# Patient Record
(demographics unavailable — no encounter records)

---

## 2025-03-02 NOTE — HISTORY OF PRESENT ILLNESS
[FreeTextEntry1] : Baudilio is a well appearing, active 18 year old who was referred for cardiac evaluation in the setting of a family history of congenital heart disease. She has high cholesterol.   Baudilio presents doing well.  Baudilio reports frequent bouts of orthostatic dizziness. Symptoms tend to be brief and self resolve.  Isolated episode of syncope in the past while standing and having her ear pierced. She was anemic at the time per parent report.  There are times of fasting, inadequate hydration. Diet high in sugar.  She has heavy periods and IBS. Anxiety and depression. Sees psychiatrist and therapist.  On sertraline and Seroquel stable dose for past 6 months. Denies symptom increase with past dosage increase.   There has been no chest pain, palpitations, shortness of breath. There has been no history of exercise induced symptoms nor recent changes in exercise tolerance or activity levels. Never chest pain, syncope, palpitations with exercise.  Reports good energy levels. She is overweight decreased weight since 2022.   Reviewed labs (2024): Normal CBC, CMP, Iron studies aside for ferritin high.  Elevated total cholesterol (297), Low HDL (39), Triglycerides (113), High LDL ( 238), high Non-HDL (258). Hgb A1c (4.9), elevated T4 (13.8), Homocysteine high (20). Positive for MTHFR gene.   There has been no recent fevers, illnesses or hospitalizations.   Mom: VSD MGF: HTN and CAD ( > 49 y/o) No additionally reported family history of an arrhythmia, aortic aneurysm, unexplained death, bicuspid aortic valve,  congenital heart disease, cardiomyopathy or sudden cardiac death, long QT syndrome, drowning or unexplained accidental death.

## 2025-03-02 NOTE — PHYSICAL EXAM
[General Appearance - Alert] : alert [General Appearance - In No Acute Distress] : in no acute distress [General Appearance - Well Nourished] : well nourished [General Appearance - Well Developed] : well developed [General Appearance - Well-Appearing] : well appearing [Appearance Of Head] : the head was normocephalic [Facies] : there were no dysmorphic facial features [Sclera] : the conjunctiva were normal [Outer Ear] : the ears and nose were normal in appearance [Examination Of The Oral Cavity] : mucous membranes were moist and pink [Auscultation Breath Sounds / Voice Sounds] : breath sounds clear to auscultation bilaterally [Normal Chest Appearance] : the chest was normal in appearance [Apical Impulse] : quiet precordium with normal apical impulse [Heart Rate And Rhythm] : normal heart rate and rhythm [Heart Sounds] : normal S1 and S2 [No Murmur] : no murmurs  [Heart Sounds Gallop] : no gallops [Heart Sounds Pericardial Friction Rub] : no pericardial rub [Edema] : no edema [Arterial Pulses] : normal upper and lower extremity pulses with no pulse delay [Heart Sounds Click] : no clicks [Capillary Refill Test] : normal capillary refill [Bowel Sounds] : normal bowel sounds [Abdomen Soft] : soft [Nondistended] : nondistended [Abdomen Tenderness] : non-tender [Motor Tone] : normal muscle strength and tone [Nail Clubbing] : no clubbing  or cyanosis of the fingers [Cervical Lymph Nodes Enlarged Anterior] : The anterior cervical nodes were normal [Cervical Lymph Nodes Enlarged Posterior] : The posterior cervical nodes were normal [] : no rash [Skin Lesions] : no lesions [Skin Turgor] : normal turgor [Mood] : mood and affect were appropriate for age [Demonstrated Behavior - Infant Nonreactive To Parents] : interactive [Demonstrated Behavior] : normal behavior [FreeTextEntry7] : Femoral Pulse +2 B/L; Posterior tibial pulse +2 B/L

## 2025-03-02 NOTE — HISTORY OF PRESENT ILLNESS
[FreeTextEntry1] : Baudilio is a well appearing, active 18 year old who was referred for cardiac evaluation in the setting of a family history of congenital heart disease. She has high cholesterol.   Baudilio presents doing well.  Baudilio reports frequent bouts of orthostatic dizziness. Symptoms tend to be brief and self resolve.  Isolated episode of syncope in the past while standing and having her ear pierced. She was anemic at the time per parent report.  There are times of fasting, inadequate hydration. Diet high in sugar.  She has heavy periods and IBS. Anxiety and depression. Sees psychiatrist and therapist.  On sertraline and Seroquel stable dose for past 6 months. Denies symptom increase with past dosage increase.   There has been no chest pain, palpitations, shortness of breath. There has been no history of exercise induced symptoms nor recent changes in exercise tolerance or activity levels. Never chest pain, syncope, palpitations with exercise.  Reports good energy levels. She is overweight decreased weight since 2022.   Reviewed labs (2024): Normal CBC, CMP, Iron studies aside for ferritin high.  Elevated total cholesterol (297), Low HDL (39), Triglycerides (113), High LDL ( 238), high Non-HDL (258). Hgb A1c (4.9), elevated T4 (13.8), Homocysteine high (20). Positive for MTHFR gene.   There has been no recent fevers, illnesses or hospitalizations.   Mom: VSD MGF: HTN and CAD ( > 51 y/o) No additionally reported family history of an arrhythmia, aortic aneurysm, unexplained death, bicuspid aortic valve,  congenital heart disease, cardiomyopathy or sudden cardiac death, long QT syndrome, drowning or unexplained accidental death.

## 2025-03-02 NOTE — CONSULT LETTER
[Today's Date] : [unfilled] [Name] : Name: [unfilled] [] : : ~~ [Today's Date:] : [unfilled] [Dear  ___:] : Dear Dr. [unfilled]: [Consult] : I had the pleasure of evaluating your patient, [unfilled]. My full evaluation follows. [Consult - Single Provider] : Thank you very much for allowing me to participate in the care of this patient. If you have any questions, please do not hesitate to contact me. [Sincerely,] : Sincerely, [FreeTextEntry4] : German Mace MD [FreeTextEntry5] : [address] [FreeTextEntry6] : [place] [id number] [de-identified] : Heraclio Laughlin DO MPH Pediatric Cardiology [facility]

## 2025-03-02 NOTE — DISCUSSION/SUMMARY
[FreeTextEntry1] : LISSY  is a healthy, thriving 18 year old who presents without identified concerns for congestive heart failure nor impaired cardiac output by her  past medical history nor on her  current physical exam.    - No significant atrial septal defect seen. Could not exclude a PFO. I explained to her  parents that this a remnant of fetal circulation and will likely close spontaneously, however it can remain patent in up to 30% of the adult population as a normal variant. I would be happy to reassess for its patency during a follow up visit, however is not a hemodynamically significant lesion at this time. If the PFO remains patent, considerations for the future would be the risk of paradoxical embolism either from a venous thrombosis or a venous gas bubble during scuba diving. There may also be associations of PFO with migraine headaches. For this reason, LISSY  should simply be aware of this part of her  medical history.   - Trivial tricuspid, pulmonary and mitral regurgitation in otherwise well functioning valves. This was not audible on physical exam and not hemodynamically significant at this time.   -There appears to be a mild sigmoid shape to her left ventricular septum. There is an area of prominence to the septum which "bulges" into the left ventricular outflow tract. It is not resulting in LVOT obstruction at this time. This finding can range from a pathologic precursor to development of hypertrophic cardiomyopathy or progression to LVOT obstruction that should be monitored for to a more benign shape variant albeit seen more commonly in adults. Recommended longitudinal surveillance and screening of first degree relatives. Preserved systolic function.    -   Incidentally noted to have a likely "high take off" of the right coronary artery which appears to arise around the 12:00 position from its appropriate right sinus of valsalva. Typically non-pathologic variant; no identified correlating clinical concerns at this time and with normal EKG. No history of exertional chest pain or intolerance. Development of either of the aforementioned concerns would warrant consideration for more advanced imaging modalities and possible further testing.   -Her pulmonary veins were difficult to delineate. Low suspicion for an anomalous origin without appreciated evidence otherwise. Will attempt to clarify at follow up.   -Frequent, brief, orthostatic dizziness in setting of times of fasting and inadequate hydration. An isolated episode of syncope in the past at rest by description appears vasovagal in origin. No recent syncope or near syncope. She was very nervous during our evaluation and found to have elevated systolic blood pressure throughout positional change and with an appreciable increase in heart rate response. Normal blood pressure in reviewed PCP records last year.   - -We discussed the need to maintain adequate hydration, drinking at least 8-10 full glasses of water per day.   -We discussed eating an adequate, healthy diet.  We discussed standing up slowly from a lying or seated position to avoid these episodes, as well as recognizing the warning signs (lightheadedness, nausea) and sitting or lying down when they occur.   -If necessary, increasing salt intake may also help alleviate these symptoms but would want to monitor blood pressure first before instituting high salt diet.   -We discussed management strategies including medication should symptoms worsen or fail to improve.  -Maintain regular aerobic exercise; reviewed symptoms with exercise that should prompt medical evaluation  -Recommended home ambulatory blood pressure monitoring. Provide records during interim prior to follow up. Likely white coat hypertension contributing but pathology should be considered.  --Should syncope or near syncope occur to contact our office for evaluation.   -  Recent lipid profile ( reportedly fasting) demonstrated an elevated total cholesterol and elevated LDL.  Her HDL was also low which serves as an additional risk factor for development of heart disease. No diabetes. There are elevated blood pressures.  We discussed the risks of a cumulative burden of high cholesterol on development of heart disease, metabolic disease and coronary artery disease. There is some family history of high cholesterol but no known familial dyslipidemia and without identified members with early onset coronary artery disease. I recommended the following:  -Ample room for dietary modifications. Recommended nutrition consultation. Increase fiber, fruits, vegetables, fish, poultry. Limit fats, sugars, dairy, red meats, cholesterol containing foods -Recommended increased aerobic exercise and reviewed symptoms with exercise to monitor for.  -Discussed implementing aforementioned changes for 4-6 months followed by a repeat fasting lipid panel.  -Discussed avoidance of additional risk factors such as obesity, alcohol and smoking -Discussed thresholds for medication initiation particularly to control LDL and triglycerides -Nutritionist referral   I recommended 4-6 month follow up and we reviewed signs and symptoms that should prompt medical attention and sooner evaluation. Above information explained in detail with assistance of a colored diagram.  LYLAH and family verbalized their understanding and all questions were answered.   [Needs SBE Prophylaxis] : [unfilled] does not need bacterial endocarditis prophylaxis [PE + No Restrictions] : [unfilled] may participate in the entire physical education program without restriction, including all varsity competitive sports.

## 2025-03-02 NOTE — CARDIOLOGY SUMMARY
[LVSF ___%] : LV Shortening Fraction [unfilled]% [de-identified] : 2/24/25 [FreeTextEntry1] : Normal sinus rhythm @ 83 bpm NM: 132 ms QRS: 90 ms  QTc: ~427 ms P-R-T Axis (51-46-28) Normal voltage and intervals No ST segment abnormalities No pre-excitation  [de-identified] : 2/24/25 [FreeTextEntry2] :  A complete 2D, M-mode, doppler and color flow doppler transthoracic pediatric echocardiogram was performed. The intracardiac anatomy and doppler flow profiles were otherwise normal appearing with the following:   Summary: 1. No evidence of significant atrial communication; cannot rule out a patent foramen ovale. 2. There appears to be a mild sigmoid shape to the interventricular septum bulging into the left ventricular outflow tract. 3. No evidence of left ventricular outflow tract obstruction. 4. The right coronary artery appears to arise from around the 12 o'clock position from above its appropriate right sinus. 5. Trivial tricuspid valve regurgitation. 6. Trivial pulmonary valve regurgitation. 7. Trivial mitral valve regurgitation. 8. Normal right ventricular morphology with qualitatively normal size and systolic function. 9. Normal left ventricular size, morphology and systolic function. 10. At least one right pulmonary vein and two left pulmonary veins return to the morphologic left atrium. 11. No pericardial effusion.

## 2025-03-02 NOTE — PHYSICAL EXAM
[General Appearance - Alert] : alert [General Appearance - In No Acute Distress] : in no acute distress [General Appearance - Well Nourished] : well nourished [General Appearance - Well-Appearing] : well appearing [General Appearance - Well Developed] : well developed [Appearance Of Head] : the head was normocephalic [Facies] : there were no dysmorphic facial features [Sclera] : the conjunctiva were normal [Outer Ear] : the ears and nose were normal in appearance [Examination Of The Oral Cavity] : mucous membranes were moist and pink [Auscultation Breath Sounds / Voice Sounds] : breath sounds clear to auscultation bilaterally [Normal Chest Appearance] : the chest was normal in appearance [Apical Impulse] : quiet precordium with normal apical impulse [Heart Rate And Rhythm] : normal heart rate and rhythm [Heart Sounds] : normal S1 and S2 [No Murmur] : no murmurs  [Heart Sounds Gallop] : no gallops [Heart Sounds Pericardial Friction Rub] : no pericardial rub [Edema] : no edema [Arterial Pulses] : normal upper and lower extremity pulses with no pulse delay [Capillary Refill Test] : normal capillary refill [Heart Sounds Click] : no clicks [Bowel Sounds] : normal bowel sounds [Abdomen Soft] : soft [Nondistended] : nondistended [Abdomen Tenderness] : non-tender [Motor Tone] : normal muscle strength and tone [Nail Clubbing] : no clubbing  or cyanosis of the fingers [Cervical Lymph Nodes Enlarged Anterior] : The anterior cervical nodes were normal [Cervical Lymph Nodes Enlarged Posterior] : The posterior cervical nodes were normal [] : no rash [Skin Lesions] : no lesions [Skin Turgor] : normal turgor [Demonstrated Behavior - Infant Nonreactive To Parents] : interactive [Mood] : mood and affect were appropriate for age [Demonstrated Behavior] : normal behavior [FreeTextEntry7] : Femoral Pulse +2 B/L; Posterior tibial pulse +2 B/L

## 2025-03-02 NOTE — CARDIOLOGY SUMMARY
[LVSF ___%] : LV Shortening Fraction [unfilled]% [de-identified] : 2/24/25 [FreeTextEntry1] : Normal sinus rhythm @ 83 bpm FL: 132 ms QRS: 90 ms  QTc: ~427 ms P-R-T Axis (51-46-28) Normal voltage and intervals No ST segment abnormalities No pre-excitation  [de-identified] : 2/24/25 [FreeTextEntry2] :  A complete 2D, M-mode, doppler and color flow doppler transthoracic pediatric echocardiogram was performed. The intracardiac anatomy and doppler flow profiles were otherwise normal appearing with the following:   Summary: 1. No evidence of significant atrial communication; cannot rule out a patent foramen ovale. 2. There appears to be a mild sigmoid shape to the interventricular septum bulging into the left ventricular outflow tract. 3. No evidence of left ventricular outflow tract obstruction. 4. The right coronary artery appears to arise from around the 12 o'clock position from above its appropriate right sinus. 5. Trivial tricuspid valve regurgitation. 6. Trivial pulmonary valve regurgitation. 7. Trivial mitral valve regurgitation. 8. Normal right ventricular morphology with qualitatively normal size and systolic function. 9. Normal left ventricular size, morphology and systolic function. 10. At least one right pulmonary vein and two left pulmonary veins return to the morphologic left atrium. 11. No pericardial effusion.

## 2025-03-02 NOTE — CONSULT LETTER
[Today's Date] : [unfilled] [Name] : Name: [unfilled] [] : : ~~ [Today's Date:] : [unfilled] [Dear  ___:] : Dear Dr. [unfilled]: [Consult] : I had the pleasure of evaluating your patient, [unfilled]. My full evaluation follows. [Consult - Single Provider] : Thank you very much for allowing me to participate in the care of this patient. If you have any questions, please do not hesitate to contact me. [Sincerely,] : Sincerely, [FreeTextEntry4] : German Mace MD [FreeTextEntry5] : [address] [FreeTextEntry6] : [place] [address] [de-identified] : Heraclio Laughlin DO MPH Pediatric Cardiology [facility]

## 2025-03-13 NOTE — REVIEW OF SYSTEMS
[Abn Vaginal bleeding] : abnormal vaginal bleeding [Pelvic pain] : pelvic pain [Anxiety] : anxiety [Depression] : depression [Negative] : Heme/Lymph

## 2025-03-13 NOTE — PLAN
[FreeTextEntry1] : GC/chlamydia ordered pap ordered  OCPs renewed  Pt to follow up for her annual in 1 year or PRN  SBE discussed

## 2025-03-13 NOTE — PHYSICAL EXAM
[Appropriately responsive] : appropriately responsive [Soft] : soft [Non-tender] : non-tender [Oriented x3] : oriented x3 [Examination Of The Breasts] : a normal appearance [No Discharge] : no discharge [No Masses] : no breast masses were palpable [Labia Majora] : normal [Labia Minora] : normal [Normal] : normal [Uterine Adnexae] : normal

## 2025-03-13 NOTE — HISTORY OF PRESENT ILLNESS
[HIV test declined] : HIV test declined [Syphilis test declined] : Syphilis test declined [Gonorrhea test declined] : Gonorrhea test declined [Chlamydia test declined] : Chlamydia test declined [Trichomonas test declined] : Trichomonas test declined [HPV test declined] : HPV test declined [Hepatitis B test declined] : Hepatitis B test declined [Hepatitis C test declined] : Hepatitis C test declined [Abnormal Quantity] : abnormal quantity [Heavy Bleeding] : heavy bleeding [Pain] : pain [FreeTextEntry1] : 18 year old female presents for her annual visit. Denies GYN complaints at this time.  Patient hx of psychiatric admissions, SI attempts and drug abuse. she takes 100mg zoloft, seroquel 150mg and klonopin as needed. she meets with a therapist weekly and a psychiatrist monthly.   OCPs were switched d/t BTB and increase in anxiety. Currently taking loestrin 1/20 and tolerating well. she had 1 episode of BTB which she believes is stress related.  attempted Idalmis, Nina (increased anxiety), Yajaira Stroud (tolerated well but had BTB) she is sexually active.    [TextBox_28] : Patient saturated about 2 pads an hour.